# Patient Record
Sex: FEMALE | Race: WHITE | Employment: STUDENT | URBAN - METROPOLITAN AREA
[De-identification: names, ages, dates, MRNs, and addresses within clinical notes are randomized per-mention and may not be internally consistent; named-entity substitution may affect disease eponyms.]

---
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
## 2024-01-23 ENCOUNTER — PROCEDURE VISIT (OUTPATIENT)
Dept: SPORTS MEDICINE | Age: 19
End: 2024-01-23

## 2024-01-23 DIAGNOSIS — S92.251A CLOSED AVULSION FRACTURE OF NAVICULAR BONE OF RIGHT FOOT, INITIAL ENCOUNTER: Primary | ICD-10-CM

## 2024-01-23 NOTE — PROGRESS NOTES
healing period.  Halle has experienced minimal pain this past week, and is eager to continue playing hockey. She would like to discuss a plan to get her through the hockey season. She did practice yesterday - worst pain was 4/10, but quickly returned to 1/10. She used 2 blister donut pads to protect the area and felt that worked well.   OBJECTIVE:   Physical Exam  Vital Signs:   [x] There were no vitals taken for this visit  Date/Time Taken         Blood Pressure         Pulse          Constitution:   Appearance: Josey Naranjo is [x] alert, [x] appears stated age, and [x] in no distress.                         Josey Naranjo general body habitus is:    [] Cachectic [] Thin [x] Normal [] Obese [] Morbidly Obese  Pulmonary: Rate   [] Fast [x] Normal [] Slow    Rhythm  [x] Regular [] Irregular   Volume [x] Adequate  [] Shallow [] Deep  Effort  [] Labored [x] Unlabored  Skin:  Color  [x] Normal [] Pale [] Cyanotic    Temperature [] Hot   [x] Warm [] Cool  [] Cold     Moisture [] Dry  [x] Moist [] Warm    Psychiatric:   [x] Good judgement and insight.  [x] Oriented to [x] person, [x] place, and [x] time.  [x] Mood appropriate for circumstances.  Gait & Station:   Gait:    [] Normal  [] Antalgic  [] Trendelenburg  [] Steppage  [] Wide  [] Unsteady   Foot:   [x] Neutral  [] Pronated  [] Supinated  Foot Type:  [x] Neutral  [] Pes Planus [] Pes Cavus  Assistive Device: [] None  [x] Brace - Cam Boot [] Cane  [] Crutches  [] Walker  [] Wheelchair  [] Other:   Inspection:   Skin:   [x] Intact [] Abrasion  [] Laceration  Notes:   Ecchymosis:  [x] None [] Mild  [] Moderate  [] Severe  Notes:   Atrophy:  [x] None [] Mild  [] Moderate  [] Severe  Notes:   Effusion:  [] None [x] Mild  [] Moderate  [] Severe  Notes:   Deformity:  [x] None [] Mild  [] Moderate  [] Severe  Notes:   Scar / Surgical incision(s): [] A-Scope Portals  [] Open Surgical Incision(s)  Notes: None  Joint Hypertrophy:  Notes: None  Contralateral Foot:  [x] Normal

## 2024-10-14 ENCOUNTER — PROCEDURE VISIT (OUTPATIENT)
Dept: SPORTS MEDICINE | Age: 19
End: 2024-10-14

## 2024-10-14 DIAGNOSIS — M67.951 TENDINOPATHY OF RIGHT GLUTEUS MEDIUS: Primary | ICD-10-CM

## 2024-10-14 NOTE — PROGRESS NOTES
physician at home after completing rehab for 6 weeks to rule out hip labral tear  Follow-Up Care / Instructions:   HEP Information:   Quad/ham stretch 2x30s  Hip 90-90 3x6  Clamshell w blue band 2x15  Glute circuit 2x5- faitgued easily  BOSU squats- 3x5  Discharged: Yes  Electronically Signed By: Yuliana Comer ATC, LAT, ATC